# Patient Record
Sex: FEMALE | Race: WHITE | NOT HISPANIC OR LATINO | Employment: FULL TIME | URBAN - METROPOLITAN AREA
[De-identification: names, ages, dates, MRNs, and addresses within clinical notes are randomized per-mention and may not be internally consistent; named-entity substitution may affect disease eponyms.]

---

## 2018-02-12 PROBLEM — M79.601: Status: ACTIVE | Noted: 2018-02-12

## 2022-10-20 ENCOUNTER — PATIENT MESSAGE (OUTPATIENT)
Dept: PEDIATRICS | Facility: CLINIC | Age: 32
End: 2022-10-20
Payer: COMMERCIAL

## 2022-10-20 ENCOUNTER — TELEPHONE (OUTPATIENT)
Dept: PEDIATRICS | Facility: CLINIC | Age: 32
End: 2022-10-20
Payer: COMMERCIAL

## 2022-10-20 NOTE — TELEPHONE ENCOUNTER
Called spoke with the patient and advised that I did not have access to her insurance information in regards to co-pays and deductibles and what not. I advised that she call the number on the back of her card member services and they would be able to assist her with that. I also advised since I noticed she had portal access I could send her the number to the financial dept. Patient was pleased with the information received and verbalized understanding. I will attempt to send the number to her through her portal access.

## 2022-10-20 NOTE — TELEPHONE ENCOUNTER
----- Message from Alex Avendano sent at 10/20/2022  3:17 PM CDT -----  Type:  Patient Returning Call    Who Called:  Pt  Who Left Message for Patient:  unk  Does the patient know what this is regarding?:  ins issues?  Best Call Back Number:  [phone]   Additional Information:  Pt trying to find out about the person who called about her ins wants to make sure it went through and also wants to know about copays.  Please advise =-- Thank you

## 2022-10-21 ENCOUNTER — HOSPITAL ENCOUNTER (OUTPATIENT)
Dept: RADIOLOGY | Facility: CLINIC | Age: 32
Discharge: HOME | End: 2022-10-21
Attending: NURSE PRACTITIONER
Payer: COMMERCIAL

## 2022-10-21 ENCOUNTER — LAB VISIT (OUTPATIENT)
Dept: LAB | Facility: CLINIC | Age: 32
End: 2022-10-21
Payer: COMMERCIAL

## 2022-10-21 ENCOUNTER — OFFICE VISIT (OUTPATIENT)
Dept: FAMILY MEDICINE | Facility: CLINIC | Age: 32
End: 2022-10-21
Payer: COMMERCIAL

## 2022-10-21 VITALS
HEART RATE: 87 BPM | BODY MASS INDEX: 53.24 KG/M2 | OXYGEN SATURATION: 99 % | SYSTOLIC BLOOD PRESSURE: 128 MMHG | WEIGHT: 271.19 LBS | RESPIRATION RATE: 18 BRPM | HEIGHT: 60 IN | TEMPERATURE: 98 F | DIASTOLIC BLOOD PRESSURE: 82 MMHG

## 2022-10-21 DIAGNOSIS — M25.50 CHRONIC PAIN OF MULTIPLE JOINTS: ICD-10-CM

## 2022-10-21 DIAGNOSIS — Z00.00 ENCOUNTER FOR WELLNESS EXAMINATION IN ADULT: ICD-10-CM

## 2022-10-21 DIAGNOSIS — G89.29 CHRONIC PAIN OF MULTIPLE JOINTS: ICD-10-CM

## 2022-10-21 DIAGNOSIS — G89.29 CHRONIC RIGHT HIP PAIN: ICD-10-CM

## 2022-10-21 DIAGNOSIS — R53.83 FATIGUE, UNSPECIFIED TYPE: ICD-10-CM

## 2022-10-21 DIAGNOSIS — M25.551 CHRONIC RIGHT HIP PAIN: ICD-10-CM

## 2022-10-21 DIAGNOSIS — Z00.00 ENCOUNTER FOR WELLNESS EXAMINATION IN ADULT: Primary | ICD-10-CM

## 2022-10-21 DIAGNOSIS — Z13.220 ENCOUNTER FOR SCREENING FOR LIPID DISORDER: ICD-10-CM

## 2022-10-21 DIAGNOSIS — M54.16 LUMBAR RADICULOPATHY, CHRONIC: ICD-10-CM

## 2022-10-21 DIAGNOSIS — R73.09 ABNORMAL GLUCOSE: Primary | ICD-10-CM

## 2022-10-21 DIAGNOSIS — R73.09 ABNORMAL GLUCOSE: ICD-10-CM

## 2022-10-21 LAB
ALBUMIN SERPL BCP-MCNC: 3.8 G/DL (ref 3.5–5.2)
ALP SERPL-CCNC: 71 U/L (ref 55–135)
ALT SERPL W/O P-5'-P-CCNC: 25 U/L (ref 10–44)
ANION GAP SERPL CALC-SCNC: 12 MMOL/L (ref 8–16)
AST SERPL-CCNC: 20 U/L (ref 10–40)
BASOPHILS # BLD AUTO: 0.09 K/UL (ref 0–0.2)
BASOPHILS NFR BLD: 0.9 % (ref 0–1.9)
BILIRUB SERPL-MCNC: 0.7 MG/DL (ref 0.1–1)
BUN SERPL-MCNC: 8 MG/DL (ref 6–20)
CALCIUM SERPL-MCNC: 9.3 MG/DL (ref 8.7–10.5)
CHLORIDE SERPL-SCNC: 102 MMOL/L (ref 95–110)
CHOLEST SERPL-MCNC: 169 MG/DL (ref 120–199)
CHOLEST/HDLC SERPL: 4.8 {RATIO} (ref 2–5)
CO2 SERPL-SCNC: 25 MMOL/L (ref 23–29)
CREAT SERPL-MCNC: 0.7 MG/DL (ref 0.5–1.4)
CRP SERPL-MCNC: 28.3 MG/L (ref 0–8.2)
DIFFERENTIAL METHOD: ABNORMAL
EOSINOPHIL # BLD AUTO: 0.3 K/UL (ref 0–0.5)
EOSINOPHIL NFR BLD: 2.5 % (ref 0–8)
ERYTHROCYTE [DISTWIDTH] IN BLOOD BY AUTOMATED COUNT: 14 % (ref 11.5–14.5)
ERYTHROCYTE [SEDIMENTATION RATE] IN BLOOD BY WESTERGREN METHOD: 33 MM/HR (ref 0–20)
EST. GFR  (NO RACE VARIABLE): >60 ML/MIN/1.73 M^2
GLUCOSE SERPL-MCNC: 124 MG/DL (ref 70–110)
HCT VFR BLD AUTO: 40 % (ref 37–48.5)
HDLC SERPL-MCNC: 35 MG/DL (ref 40–75)
HDLC SERPL: 20.7 % (ref 20–50)
HGB BLD-MCNC: 12.6 G/DL (ref 12–16)
IMM GRANULOCYTES # BLD AUTO: 0.31 K/UL (ref 0–0.04)
IMM GRANULOCYTES NFR BLD AUTO: 2.9 % (ref 0–0.5)
LDLC SERPL CALC-MCNC: 108.6 MG/DL (ref 63–159)
LYMPHOCYTES # BLD AUTO: 2 K/UL (ref 1–4.8)
LYMPHOCYTES NFR BLD: 19.3 % (ref 18–48)
MCH RBC QN AUTO: 25.8 PG (ref 27–31)
MCHC RBC AUTO-ENTMCNC: 31.5 G/DL (ref 32–36)
MCV RBC AUTO: 82 FL (ref 82–98)
MONOCYTES # BLD AUTO: 0.6 K/UL (ref 0.3–1)
MONOCYTES NFR BLD: 6.1 % (ref 4–15)
NEUTROPHILS # BLD AUTO: 7.2 K/UL (ref 1.8–7.7)
NEUTROPHILS NFR BLD: 68.3 % (ref 38–73)
NONHDLC SERPL-MCNC: 134 MG/DL
NRBC BLD-RTO: 0 /100 WBC
PLATELET # BLD AUTO: 427 K/UL (ref 150–450)
PMV BLD AUTO: 10.2 FL (ref 9.2–12.9)
POTASSIUM SERPL-SCNC: 3.9 MMOL/L (ref 3.5–5.1)
PROT SERPL-MCNC: 7.7 G/DL (ref 6–8.4)
RBC # BLD AUTO: 4.88 M/UL (ref 4–5.4)
SODIUM SERPL-SCNC: 139 MMOL/L (ref 136–145)
TRIGL SERPL-MCNC: 127 MG/DL (ref 30–150)
TSH SERPL DL<=0.005 MIU/L-ACNC: 1.61 UIU/ML (ref 0.4–4)
URATE SERPL-MCNC: 6.9 MG/DL (ref 2.4–5.7)
WBC # BLD AUTO: 10.52 K/UL (ref 3.9–12.7)

## 2022-10-21 PROCEDURE — 80053 COMPREHEN METABOLIC PANEL: CPT | Performed by: NURSE PRACTITIONER

## 2022-10-21 PROCEDURE — 73502 X-RAY EXAM HIP UNI 2-3 VIEWS: CPT | Mod: TC,RT,, | Performed by: FAMILY MEDICINE

## 2022-10-21 PROCEDURE — 72100 XR LUMBAR SPINE AP AND LATERAL: ICD-10-PCS | Mod: TC,,, | Performed by: FAMILY MEDICINE

## 2022-10-21 PROCEDURE — 3074F PR MOST RECENT SYSTOLIC BLOOD PRESSURE < 130 MM HG: ICD-10-PCS | Mod: S$GLB,,, | Performed by: NURSE PRACTITIONER

## 2022-10-21 PROCEDURE — 99999 PR PBB SHADOW E&M-EST. PATIENT-LVL IV: ICD-10-PCS | Mod: PBBFAC,,, | Performed by: NURSE PRACTITIONER

## 2022-10-21 PROCEDURE — 86140 C-REACTIVE PROTEIN: CPT | Performed by: NURSE PRACTITIONER

## 2022-10-21 PROCEDURE — 1160F RVW MEDS BY RX/DR IN RCRD: CPT | Mod: S$GLB,,, | Performed by: NURSE PRACTITIONER

## 2022-10-21 PROCEDURE — 72100 X-RAY EXAM L-S SPINE 2/3 VWS: CPT | Mod: 26,,, | Performed by: RADIOLOGY

## 2022-10-21 PROCEDURE — 99204 PR OFFICE/OUTPT VISIT, NEW, LEVL IV, 45-59 MIN: ICD-10-PCS | Mod: S$GLB,,, | Performed by: NURSE PRACTITIONER

## 2022-10-21 PROCEDURE — 85025 COMPLETE CBC W/AUTO DIFF WBC: CPT | Performed by: NURSE PRACTITIONER

## 2022-10-21 PROCEDURE — 3079F PR MOST RECENT DIASTOLIC BLOOD PRESSURE 80-89 MM HG: ICD-10-PCS | Mod: S$GLB,,, | Performed by: NURSE PRACTITIONER

## 2022-10-21 PROCEDURE — 72100 X-RAY EXAM L-S SPINE 2/3 VWS: CPT | Mod: TC,,, | Performed by: FAMILY MEDICINE

## 2022-10-21 PROCEDURE — 83036 HEMOGLOBIN GLYCOSYLATED A1C: CPT | Performed by: NURSE PRACTITIONER

## 2022-10-21 PROCEDURE — 3074F SYST BP LT 130 MM HG: CPT | Mod: S$GLB,,, | Performed by: NURSE PRACTITIONER

## 2022-10-21 PROCEDURE — 1159F PR MEDICATION LIST DOCUMENTED IN MEDICAL RECORD: ICD-10-PCS | Mod: S$GLB,,, | Performed by: NURSE PRACTITIONER

## 2022-10-21 PROCEDURE — 1159F MED LIST DOCD IN RCRD: CPT | Mod: S$GLB,,, | Performed by: NURSE PRACTITIONER

## 2022-10-21 PROCEDURE — 99204 OFFICE O/P NEW MOD 45 MIN: CPT | Mod: S$GLB,,, | Performed by: NURSE PRACTITIONER

## 2022-10-21 PROCEDURE — 1160F PR REVIEW ALL MEDS BY PRESCRIBER/CLIN PHARMACIST DOCUMENTED: ICD-10-PCS | Mod: S$GLB,,, | Performed by: NURSE PRACTITIONER

## 2022-10-21 PROCEDURE — 84443 ASSAY THYROID STIM HORMONE: CPT | Performed by: NURSE PRACTITIONER

## 2022-10-21 PROCEDURE — 72100 XR LUMBAR SPINE AP AND LATERAL: ICD-10-PCS | Mod: 26,,, | Performed by: RADIOLOGY

## 2022-10-21 PROCEDURE — 3079F DIAST BP 80-89 MM HG: CPT | Mod: S$GLB,,, | Performed by: NURSE PRACTITIONER

## 2022-10-21 PROCEDURE — 86431 RHEUMATOID FACTOR QUANT: CPT | Performed by: NURSE PRACTITIONER

## 2022-10-21 PROCEDURE — 85651 RBC SED RATE NONAUTOMATED: CPT | Performed by: NURSE PRACTITIONER

## 2022-10-21 PROCEDURE — 99999 PR PBB SHADOW E&M-EST. PATIENT-LVL IV: CPT | Mod: PBBFAC,,, | Performed by: NURSE PRACTITIONER

## 2022-10-21 PROCEDURE — 80061 LIPID PANEL: CPT | Performed by: NURSE PRACTITIONER

## 2022-10-21 PROCEDURE — 86038 ANTINUCLEAR ANTIBODIES: CPT | Performed by: NURSE PRACTITIONER

## 2022-10-21 PROCEDURE — 73502 X-RAY EXAM HIP UNI 2-3 VIEWS: CPT | Mod: 26,RT,, | Performed by: RADIOLOGY

## 2022-10-21 PROCEDURE — 84550 ASSAY OF BLOOD/URIC ACID: CPT | Performed by: NURSE PRACTITIONER

## 2022-10-21 PROCEDURE — 73502 XR HIP WITH PELVIS WHEN PERFORMED, 2 OR 3  VIEWS RIGHT: ICD-10-PCS | Mod: 26,RT,, | Performed by: RADIOLOGY

## 2022-10-21 PROCEDURE — 73502 XR HIP WITH PELVIS WHEN PERFORMED, 2 OR 3  VIEWS RIGHT: ICD-10-PCS | Mod: TC,RT,, | Performed by: FAMILY MEDICINE

## 2022-10-21 NOTE — PROGRESS NOTES
"Subjective:       Patient ID: Sarai Cleveland is a 32 y.o. female.    Chief Complaint: Establish Care and body pain    Sarai Cleveland presents to the clinic today to establish care  She reports no significant health history, but reports that a few years ago she started having multiple joint pains/stiffness that was mainly problematic upon awakening in the morning/ attempting to stand up. This has since progressed to intermittent flares of overall body aches/joint pains. She reports that her low back and right hip have been her 2 areas of significance recently. 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 Denies hitting her head. She reports that her right hip will "freeze up/lock up" when she walks and that this will also occur to her right knee as well.   Has a family history of autoimmune issues on her mother's side including RA, Fibromyalgia, Lupus, reports mother has MS.   She reports that the pain decreases with rest. Has used multiple otc medications with no relief. Reports she can push on the lateral side of her right hip when it is causing her discomfort and this sometimes can provide relief. She will have intermittent radiating sharp pains at times down her right leg with certain movements.         Review of Systems   Constitutional: Negative.    HENT: Negative.     Eyes: Negative.    Cardiovascular:  Negative for chest pain, palpitations and leg swelling.   Gastrointestinal: Negative.    Endocrine: Negative.    Genitourinary: Negative.    Musculoskeletal:  Positive for arthralgias, back pain and gait problem.   Allergic/Immunologic: Negative.    Neurological:  Positive for weakness, numbness and headaches. Negative for dizziness and light-headedness.   Hematological: Negative.    Psychiatric/Behavioral: Negative.       Patient Active Problem List   Diagnosis    Upper extremity pain, " anterior, right       Objective:      Physical Exam  Constitutional:       General: She is not in acute distress.     Appearance: Normal appearance. She is obese.   Eyes:      Conjunctiva/sclera: Conjunctivae normal.      Pupils: Pupils are equal, round, and reactive to light.   Cardiovascular:      Rate and Rhythm: Normal rate and regular rhythm.      Heart sounds: Normal heart sounds. No murmur heard.  Pulmonary:      Effort: Pulmonary effort is normal. No respiratory distress.      Breath sounds: Normal breath sounds.   Abdominal:      General: Bowel sounds are normal. There is no distension.   Musculoskeletal:      Lumbar back: Bony tenderness present. Decreased range of motion.      Right hip: Bony tenderness present.        Legs:    Skin:     General: Skin is warm and dry.      Findings: No rash.   Neurological:      General: No focal deficit present.      Mental Status: She is alert and oriented to person, place, and time.   Psychiatric:         Mood and Affect: Mood normal.         Behavior: Behavior normal.       Lab Results   Component Value Date    WBC 13.85 (H) 06/08/2018    HGB 11.6 (L) 06/08/2018    HCT 36.0 (L) 06/08/2018     (H) 06/08/2018    ALT 28 06/08/2018    AST 56 (H) 06/08/2018     06/08/2018    K 5.5 (H) 06/08/2018     06/08/2018    CREATININE 0.59 06/08/2018    BUN 11 06/08/2018    CO2 24 06/08/2018     The ASCVD Risk score (Tsering HANSON, et al., 2019) failed to calculate for the following reasons:    The 2019 ASCVD risk score is only valid for ages 40 to 79  Visit Vitals  /82 (BP Location: Right arm, Patient Position: Sitting, BP Method: Large (Manual))   Pulse 87   Temp 98.4 °F (36.9 °C)   Resp 18   Ht 5' (1.524 m)   Wt 123 kg (271 lb 2.7 oz)   LMP 10/17/2022 (Exact Date)   SpO2 99%   BMI 52.96 kg/m²      Assessment:       1. Encounter for wellness examination in adult    2. Encounter for screening for lipid disorder    3. Fatigue, unspecified type    4. Chronic right  hip pain    5. Lumbar radiculopathy, chronic    6. Chronic pain of multiple joints        Plan:       1. Encounter for wellness examination in adult  -     Comprehensive Metabolic Panel; Future; Expected date: 10/21/2022  Obtain fasting blood work for review.   Obtain imaging for review.   2. Encounter for screening for lipid disorder  -     Lipid Panel; Future; Expected date: 10/21/2022    3. Fatigue, unspecified type  -     TSH; Future; Expected date: 10/21/2022  -     CBC Auto Differential; Future; Expected date: 10/21/2022    4. Chronic right hip pain  -     X-Ray Hip 2 or 3 views Right (with Pelvis when performed); Future; Expected date: 10/21/2022    5. Lumbar radiculopathy, chronic  -     X-Ray Lumbar Spine AP And Lateral; Future; Expected date: 10/21/2022    6. Chronic pain of multiple joints  -     Uric Acid; Future; Expected date: 10/21/2022  -     C-Reactive Protein; Future; Expected date: 10/21/2022  -     Sedimentation rate; Future; Expected date: 10/21/2022  -     KAREN Screen w/Reflex; Future; Expected date: 10/21/2022  -     Rheumatoid Factor; Future; Expected date: 10/21/2022   Aleve twice daily with food  Topicals: Voltaren, Solanpas, Biofreeze   Follow up in about 1 month (around 11/21/2022).      Future Appointments       Date Provider Specialty Appt Notes    10/21/2022  Radiology lumbar, hips    10/21/2022  Radiology lumbar, hips    10/21/2022  Lab fasting     11/25/2022 Mirtha Mejia NP Family Medicine 1 month follow up

## 2022-10-22 LAB
ESTIMATED AVG GLUCOSE: 123 MG/DL (ref 68–131)
HBA1C MFR BLD: 5.9 % (ref 4–5.6)
RHEUMATOID FACT SERPL-ACNC: <13 IU/ML (ref 0–15)

## 2022-10-24 ENCOUNTER — PATIENT MESSAGE (OUTPATIENT)
Dept: FAMILY MEDICINE | Facility: CLINIC | Age: 32
End: 2022-10-24
Payer: COMMERCIAL

## 2022-10-24 DIAGNOSIS — R73.03 PREDIABETES: ICD-10-CM

## 2022-10-24 DIAGNOSIS — M10.00 IDIOPATHIC GOUT, UNSPECIFIED CHRONICITY, UNSPECIFIED SITE: Primary | ICD-10-CM

## 2022-10-24 LAB — ANA SER QL IF: NORMAL

## 2022-10-24 RX ORDER — ALLOPURINOL 100 MG/1
100 TABLET ORAL DAILY
Qty: 30 TABLET | Refills: 5 | Status: SHIPPED | OUTPATIENT
Start: 2022-10-24

## 2022-10-24 NOTE — TELEPHONE ENCOUNTER
I have placed a referral to Gretta Warren which is a nutritionist in [place] to provide some assistance with dietary changes

## 2022-10-31 ENCOUNTER — TELEPHONE (OUTPATIENT)
Dept: FAMILY MEDICINE | Facility: CLINIC | Age: 32
End: 2022-10-31
Payer: COMMERCIAL

## 2022-10-31 DIAGNOSIS — M10.00 IDIOPATHIC GOUT, UNSPECIFIED CHRONICITY, UNSPECIFIED SITE: ICD-10-CM

## 2022-10-31 DIAGNOSIS — R73.03 PREDIABETES: Primary | ICD-10-CM

## 2022-10-31 NOTE — TELEPHONE ENCOUNTER
----- Message from Lana Lopez LPN sent at 10/29/2022  8:59 AM CDT -----  Regarding: FW: referral for prediabetes    ----- Message -----  From: Gretta Warren RD  Sent: 10/28/2022  10:00 AM CDT  To: Lana Lopez LPN  Subject: referral for prediabetes                         Hi, I received a referral to nutrition for dx prediabetes for this patient via a CC'ed chart a couple of days ago.  I am in diabetes education and cannot accept any referrals for patients who do not have a diabetes diagnosis because the patient will get a huge bill. I delayed my response because I was trying to find a general dietitian in the state of [place], but currently we do not have an option within the system I know of.  The closest option is the general dietitian at [facility].  The referral would be the same type refer to nutrition, but the region of [place] need to be listed as where she can be seen.  I am sorry for the inconvenience.  Gretta Pickens   Supfrancisco javier - Administrative Dietitian  Nutritional Services  [address] [address]  Office: [phone]  Fax: [phone]  [email or web address] includes the [facility]

## 2022-11-09 ENCOUNTER — OFFICE VISIT (OUTPATIENT)
Dept: FAMILY MEDICINE | Facility: CLINIC | Age: 32
End: 2022-11-09
Payer: COMMERCIAL

## 2022-11-09 DIAGNOSIS — M25.551 RIGHT HIP PAIN: Primary | ICD-10-CM

## 2022-11-09 PROCEDURE — 1159F PR MEDICATION LIST DOCUMENTED IN MEDICAL RECORD: ICD-10-PCS | Mod: 95,,,

## 2022-11-09 PROCEDURE — 1160F PR REVIEW ALL MEDS BY PRESCRIBER/CLIN PHARMACIST DOCUMENTED: ICD-10-PCS | Mod: 95,,,

## 2022-11-09 PROCEDURE — 3044F PR MOST RECENT HEMOGLOBIN A1C LEVEL <7.0%: ICD-10-PCS | Mod: 95,,,

## 2022-11-09 PROCEDURE — 1159F MED LIST DOCD IN RCRD: CPT | Mod: 95,,,

## 2022-11-09 PROCEDURE — 99214 PR OFFICE/OUTPT VISIT, EST, LEVL IV, 30-39 MIN: ICD-10-PCS | Mod: 95,,,

## 2022-11-09 PROCEDURE — 3044F HG A1C LEVEL LT 7.0%: CPT | Mod: 95,,,

## 2022-11-09 PROCEDURE — 1160F RVW MEDS BY RX/DR IN RCRD: CPT | Mod: 95,,,

## 2022-11-09 PROCEDURE — 99214 OFFICE O/P EST MOD 30 MIN: CPT | Mod: 95,,,

## 2022-11-09 RX ORDER — IBUPROFEN 800 MG/1
800 TABLET ORAL 3 TIMES DAILY
Qty: 30 TABLET | Refills: 0 | Status: SHIPPED | OUTPATIENT
Start: 2022-11-09

## 2022-11-09 RX ORDER — METHYLPREDNISOLONE 4 MG/1
TABLET ORAL
Qty: 21 EACH | Refills: 0 | Status: SHIPPED | OUTPATIENT
Start: 2022-11-09 | End: 2022-11-30

## 2022-11-09 RX ORDER — CYCLOBENZAPRINE HCL 10 MG
10 TABLET ORAL 3 TIMES DAILY PRN
Qty: 30 TABLET | Refills: 0 | Status: SHIPPED | OUTPATIENT
Start: 2022-11-09 | End: 2022-11-19

## 2022-11-09 NOTE — PROGRESS NOTES
"Subjective:       Patient ID: Sarai Cleveland is a 32 y.o. female.  The patient location is: [place], [place]  The chief complaint leading to consultation is: Right hip pain    Visit type: audiovisual    Face to Face time with patient: 10 minutes  22 minutes of total time spent on the encounter, which includes face to face time and non-face to face time preparing to see the patient (eg, review of tests), Obtaining and/or reviewing separately obtained history, Documenting clinical information in the electronic or other health record, Independently interpreting results (not separately reported) and communicating results to the patient/family/caregiver, or Care coordination (not separately reported).         Each patient to whom he or she provides medical services by telemedicine is:  (1) informed of the relationship between the physician and patient and the respective role of any other health care provider with respect to management of the patient; and (2) notified that he or she may decline to receive medical services by telemedicine and may withdraw from such care at any time.      Chief Complaint: Right Hip pain    Patient presents to the clinic virtually with complaint of right hip pain.     Has been ongoing for years. Seems to be worsening. States since her last visit with the clinic she has fallen 3 times. States her leg will "go out" on her and yesterday she almost fell off of a ladder at work. States she is now in pain all the time now and has a hard time walking. Has taken Motrin without relief.     EXAMINATION:  XR HIP WITH PELVIS WHEN PERFORMED, 2 OR 3  VIEWS RIGHT  CLINICAL HISTORY:  Pain in right hip  TECHNIQUE:  AP view of the pelvis and frog leg lateral view of the right hip were performed.  FINDINGS:  No displaced fracture.  No malalignment.  Preservation of pelvic joint spaces.    Patient educated on plan of care, verbalized understanding.      Review of Systems   Constitutional:  Negative for activity " change and unexpected weight change.   HENT:  Negative for hearing loss, rhinorrhea and trouble swallowing.    Eyes:  Negative for discharge and visual disturbance.   Respiratory:  Negative for chest tightness and wheezing.    Cardiovascular:  Negative for chest pain and palpitations.   Gastrointestinal:  Negative for blood in stool, constipation, diarrhea and vomiting.   Endocrine: Negative for polydipsia and polyuria.   Genitourinary:  Negative for difficulty urinating, dysuria, hematuria and menstrual problem.   Musculoskeletal:  Positive for arthralgias, joint swelling and neck pain.        Right hip pain and weakness   Neurological:  Positive for weakness. Negative for headaches.   Psychiatric/Behavioral:  Positive for dysphoric mood. Negative for confusion.      Patient Active Problem List   Diagnosis    Upper extremity pain, anterior, right       Objective:      Physical Exam  Vitals and nursing note reviewed.   Constitutional:       General: She is not in acute distress.     Appearance: Normal appearance. She is well-developed. She is not ill-appearing.   HENT:      Head: Normocephalic.      Nose: Nose normal.   Eyes:      Conjunctiva/sclera: Conjunctivae normal.      Pupils: Pupils are equal, round, and reactive to light.      Comments: As seen on virtual visit   Pulmonary:      Effort: Pulmonary effort is normal. No respiratory distress.   Abdominal:      General: There is no distension.      Tenderness: There is no abdominal tenderness.   Musculoskeletal:      Cervical back: Normal range of motion and neck supple.   Skin:     Findings: No rash.   Neurological:      General: No focal deficit present.      Mental Status: She is alert and oriented to person, place, and time.   Psychiatric:         Mood and Affect: Mood normal.         Behavior: Behavior normal.       Lab Results   Component Value Date    WBC 10.52 10/21/2022    HGB 12.6 10/21/2022    HCT 40.0 10/21/2022     10/21/2022    CHOL 169  10/21/2022    TRIG 127 10/21/2022    HDL 35 (L) 10/21/2022    ALT 25 10/21/2022    AST 20 10/21/2022     10/21/2022    K 3.9 10/21/2022     10/21/2022    CREATININE 0.7 10/21/2022    BUN 8 10/21/2022    CO2 25 10/21/2022    TSH 1.611 10/21/2022    HGBA1C 5.9 (H) 10/21/2022     The ASCVD Risk score (Tsering DK, et al., 2019) failed to calculate for the following reasons:    The 2019 ASCVD risk score is only valid for ages 40 to 79    Assessment:       1. Right hip pain        Plan:       1. Right hip pain  -     methylPREDNISolone (MEDROL DOSEPACK) 4 mg tablet; use as directed  Dispense: 21 each; Refill: 0  -     ibuprofen (ADVIL,MOTRIN) 800 MG tablet; Take 1 tablet (800 mg total) by mouth 3 (three) times daily.  Dispense: 30 tablet; Refill: 0  -     cyclobenzaprine (FLEXERIL) 10 MG tablet; Take 1 tablet (10 mg total) by mouth 3 (three) times daily as needed for Muscle spasms.  Dispense: 30 tablet; Refill: 0  -     Ambulatory referral/consult to Sports Medicine; Future; Expected date: 11/16/2022     Follow up if symptoms worsen or fail to improve.      Future Appointments       Date Provider Specialty Appt Notes    11/10/2022 Raphael Vela MD Sports Medicine [id number]]    11/25/2022 Mirtha Mejia NP Family Medicine 1 month follow up

## 2022-11-09 NOTE — PATIENT INSTRUCTIONS

## 2022-11-09 NOTE — LETTER
November 9, 2022      [address]  [place] [address]  Phone: [phone]  Fax: [phone]       Patient: Sarai Cleveland   YOB: 1990  Date of Visit: 11/09/2022    To Whom It May Concern:    Shelly Cleveland  was at [facility] on 11/09/2022. The patient may return to work/school on 11/09/22 with restrictions. Patient cannot climb on ladders at this time. If you have any questions or concerns, or if I can be of further assistance, please do not hesitate to contact me.    Sincerely,    Ayla De Santiago, NP

## 2022-11-10 ENCOUNTER — OFFICE VISIT (OUTPATIENT)
Dept: SPORTS MEDICINE | Facility: CLINIC | Age: 32
End: 2022-11-10
Payer: COMMERCIAL

## 2022-11-10 VITALS — WEIGHT: 271 LBS | HEIGHT: 60 IN | BODY MASS INDEX: 53.2 KG/M2 | RESPIRATION RATE: 17 BRPM

## 2022-11-10 DIAGNOSIS — R79.82 ELEVATED C-REACTIVE PROTEIN (CRP): ICD-10-CM

## 2022-11-10 DIAGNOSIS — M25.551 RIGHT HIP PAIN: ICD-10-CM

## 2022-11-10 DIAGNOSIS — M25.851 RIGHT HIP IMPINGEMENT SYNDROME: Primary | ICD-10-CM

## 2022-11-10 DIAGNOSIS — M79.7 FIBROMYALGIA: ICD-10-CM

## 2022-11-10 PROCEDURE — 1159F MED LIST DOCD IN RCRD: CPT | Mod: S$GLB,,, | Performed by: FAMILY MEDICINE

## 2022-11-10 PROCEDURE — 99999 PR PBB SHADOW E&M-EST. PATIENT-LVL III: CPT | Mod: PBBFAC,,, | Performed by: FAMILY MEDICINE

## 2022-11-10 PROCEDURE — 3008F PR BODY MASS INDEX (BMI) DOCUMENTED: ICD-10-PCS | Mod: S$GLB,,, | Performed by: FAMILY MEDICINE

## 2022-11-10 PROCEDURE — 99203 OFFICE O/P NEW LOW 30 MIN: CPT | Mod: S$GLB,,, | Performed by: FAMILY MEDICINE

## 2022-11-10 PROCEDURE — 3044F PR MOST RECENT HEMOGLOBIN A1C LEVEL <7.0%: ICD-10-PCS | Mod: S$GLB,,, | Performed by: FAMILY MEDICINE

## 2022-11-10 PROCEDURE — 3008F BODY MASS INDEX DOCD: CPT | Mod: S$GLB,,, | Performed by: FAMILY MEDICINE

## 2022-11-10 PROCEDURE — 3044F HG A1C LEVEL LT 7.0%: CPT | Mod: S$GLB,,, | Performed by: FAMILY MEDICINE

## 2022-11-10 PROCEDURE — 99999 PR PBB SHADOW E&M-EST. PATIENT-LVL III: ICD-10-PCS | Mod: PBBFAC,,, | Performed by: FAMILY MEDICINE

## 2022-11-10 PROCEDURE — 1159F PR MEDICATION LIST DOCUMENTED IN MEDICAL RECORD: ICD-10-PCS | Mod: S$GLB,,, | Performed by: FAMILY MEDICINE

## 2022-11-10 PROCEDURE — 99203 PR OFFICE/OUTPT VISIT, NEW, LEVL III, 30-44 MIN: ICD-10-PCS | Mod: S$GLB,,, | Performed by: FAMILY MEDICINE

## 2022-11-10 NOTE — PROGRESS NOTES
Subjective:          Chief Complaint: Sarai Cleveland is a 32 y.o. female who had concerns including Pain of the Right Hip.    Patient here today with complaints of right hip pain.  This been present now for approximately two months and notes that her right hip seems to give out causing her to fall.  Notes that she fell yesterday while climbing up a ladder when her hip gave out.  She feels a popping sensation in the anterior aspect of her hip.  Can relieve the pain by pushing directly on it.  Also complains of pain all over her body particularly in neck back and shoulder regions.  Of note she has a strong family history of MS as well as fibromyalgia and rheumatological disease.  Her PCP recently tested her for rheumatological disease with a negative RF negative KAREN but an elevated ESR and CRP.  She is been prescribed a Medrol Dosepak but has not started it yet.  She was also prescribed ibuprofen and Flexeril which she did take it noted some relief of this.      Review of Systems   Constitutional: Negative for chills and decreased appetite.   HENT:  Negative for congestion and sore throat.    Eyes:  Negative for blurred vision.   Cardiovascular:  Negative for chest pain, dyspnea on exertion and palpitations.   Respiratory:  Negative for cough and shortness of breath.    Skin:  Negative for rash.   Neurological:  Negative for difficulty with concentration, disturbances in coordination and headaches.   Psychiatric/Behavioral:  Negative for altered mental status, depression, hallucinations, memory loss and suicidal ideas.                  Objective:        General: Sarai is well-developed, well-nourished, appears stated age, in no acute distress, alert and oriented to time, place and person.     General    Nursing note and vitals reviewed.  Constitutional: She is oriented to person, place, and time. She appears well-developed and well-nourished.   HENT:   Nose: Nose normal.   Eyes: EOM are normal. Pupils are equal, round,  and reactive to light.   Neck: Neck supple.   Cardiovascular:  Normal rate.            Pulmonary/Chest: Effort normal.   Abdominal: Soft.   Neurological: She is alert and oriented to person, place, and time. She has normal reflexes.   Psychiatric: She has a normal mood and affect. Her behavior is normal. Judgment and thought content normal.             Right Hip Exam     Inspection   Scars: absent  Swelling: absent  No deformity of hip.    Tenderness   The patient tender to palpation of the rectus insertion.    Crepitus   The patient has crepitus of the rectus insertion.    Range of Motion   Extension:  20   Flexion:  140   External rotation:  80   Internal rotation:  30     Muscle Strength   The patient has normal right hip strength.    Tests   Pain w/ forced internal rotation (DAPHNE): present  Pain w/ forced external rotation (FADIR): present  Lenora: negative  Trendelenburg Test: positive  Circumduction test: negative  Log Roll: positive  Snapping Hip (internal): negative    Other   Sensation: normal  Left Hip Exam   Left hip exam is normal.    Range of Motion   The patient has normal left hip ROM.    Muscle Strength   The patient has normal left hip strength.           Vascular Exam     Right Pulses  Dorsalis Pedis:      2+          I reviewed the x-ray images from 10/21/22. There are prominent pincer lesions in both hip. No obvious degenerative changes and no acute fractures noted.         Assessment:       Encounter Diagnoses   Name Primary?    Right hip pain     Right hip impingement syndrome Yes    Elevated C-reactive protein (CRP)     Fibromyalgia           Plan:         Sarai was seen today for pain.    Diagnoses and all orders for this visit:    Right hip impingement syndrome  -     Ambulatory referral/consult to Physical/Occupational Therapy; Future    Right hip pain  -     Ambulatory referral/consult to Sports Medicine  -     Ambulatory referral/consult to Physical/Occupational Therapy; Future    Elevated  C-reactive protein (CRP)    Fibromyalgia    Her exam today is highly suspicious for femoral acetabular impingement.  Advised her to start him complete the Medrol Dosepak and gauge her response.  She would also benefit from physical therapy swallow place that order.  Will have her follow-up in approximately four weeks in the [facility].  She does not have significant improvement of her hip pain we will consider an ultrasound-guided intra-articular injection of the right hip joint.              Patient questionnaires may have been collected.